# Patient Record
Sex: FEMALE | Race: BLACK OR AFRICAN AMERICAN | NOT HISPANIC OR LATINO | Employment: OTHER | URBAN - METROPOLITAN AREA
[De-identification: names, ages, dates, MRNs, and addresses within clinical notes are randomized per-mention and may not be internally consistent; named-entity substitution may affect disease eponyms.]

---

## 2017-03-21 ENCOUNTER — OFFICE VISIT (OUTPATIENT)
Dept: INTERNAL MEDICINE | Facility: CLINIC | Age: 62
End: 2017-03-21

## 2017-03-21 VITALS
SYSTOLIC BLOOD PRESSURE: 136 MMHG | WEIGHT: 155 LBS | HEIGHT: 61 IN | DIASTOLIC BLOOD PRESSURE: 90 MMHG | BODY MASS INDEX: 29.27 KG/M2 | HEART RATE: 74 BPM | OXYGEN SATURATION: 99 %

## 2017-03-21 DIAGNOSIS — E78.2 MIXED HYPERLIPIDEMIA: ICD-10-CM

## 2017-03-21 DIAGNOSIS — J00 ACUTE NASOPHARYNGITIS: ICD-10-CM

## 2017-03-21 DIAGNOSIS — I10 ESSENTIAL HYPERTENSION: Primary | ICD-10-CM

## 2017-03-21 LAB
ALBUMIN SERPL-MCNC: 3.51 G/DL (ref 3.4–4.6)
ALBUMIN/GLOB SERPL: 1 G/DL
ALP SERPL-CCNC: 84 U/L (ref 46–116)
ALT SERPL W P-5'-P-CCNC: 30 U/L (ref 14–59)
ANION GAP SERPL CALCULATED.3IONS-SCNC: 5 MMOL/L
AST SERPL-CCNC: 17 U/L (ref 7–37)
BASOPHILS # BLD AUTO: 0.01 10*3/MM3 (ref 0–0.2)
BASOPHILS NFR BLD AUTO: 0.1 % (ref 0–2)
BILIRUB SERPL-MCNC: 0.3 MG/DL (ref 0.2–1)
BUN BLD-MCNC: 20 MG/DL (ref 6–22)
BUN/CREAT SERPL: 19.2 (ref 7–25)
CALCIUM SPEC-SCNC: 8.9 MG/DL (ref 8.6–10.5)
CHLORIDE SERPL-SCNC: 105 MMOL/L (ref 95–107)
CHOLEST SERPL-MCNC: 149 MG/DL (ref 0–200)
CO2 SERPL-SCNC: 31 MMOL/L (ref 23–32)
CREAT BLD-MCNC: 1.04 MG/DL (ref 0.55–1.02)
DEPRECATED RDW RBC AUTO: 44.5 FL (ref 37–54)
EOSINOPHIL # BLD AUTO: 0.12 10*3/MM3 (ref 0–0.7)
EOSINOPHIL NFR BLD AUTO: 1.7 % (ref 0–5)
ERYTHROCYTE [DISTWIDTH] IN BLOOD BY AUTOMATED COUNT: 13.4 % (ref 11.5–15)
GFR SERPL CREATININE-BSD FRML MDRD: 65 ML/MIN/1.73
GLOBULIN UR ELPH-MCNC: 3.4 GM/DL
GLUCOSE BLD-MCNC: 91 MG/DL (ref 70–100)
HCT VFR BLD AUTO: 35.2 % (ref 34.1–44.9)
HDLC SERPL-MCNC: 64 MG/DL (ref 40–81)
HGB BLD-MCNC: 10.9 G/DL (ref 11.2–15.7)
LDLC SERPL CALC-MCNC: 67 MG/DL (ref 0–100)
LDLC/HDLC SERPL: 1.05 {RATIO}
LYMPHOCYTES # BLD AUTO: 1.62 10*3/MM3 (ref 0.8–7)
LYMPHOCYTES NFR BLD AUTO: 23.6 % (ref 10–60)
MCH RBC QN AUTO: 28.7 PG (ref 26–34)
MCHC RBC AUTO-ENTMCNC: 31 G/DL (ref 31–37)
MCV RBC AUTO: 92.6 FL (ref 80–100)
MONOCYTES # BLD AUTO: 0.3 10*3/MM3 (ref 0–1)
MONOCYTES NFR BLD AUTO: 4.4 % (ref 0–13)
NEUTROPHILS # BLD AUTO: 4.82 10*3/MM3 (ref 1–11)
NEUTROPHILS NFR BLD AUTO: 70.2 % (ref 30–85)
PLATELET # BLD AUTO: 184 10*3/MM3 (ref 150–450)
PMV BLD AUTO: 12.3 FL (ref 6–12)
POTASSIUM BLD-SCNC: 4.9 MMOL/L (ref 3.3–5.3)
PROT SERPL-MCNC: 6.9 G/DL (ref 6.3–8.4)
RBC # BLD AUTO: 3.8 10*6/MM3 (ref 3.93–5.22)
SODIUM BLD-SCNC: 141 MMOL/L (ref 136–145)
TRIGL SERPL-MCNC: 89 MG/DL (ref 0–150)
VLDLC SERPL-MCNC: 17.8 MG/DL
WBC NRBC COR # BLD: 6.87 10*3/MM3 (ref 5–10)

## 2017-03-21 PROCEDURE — 85025 COMPLETE CBC W/AUTO DIFF WBC: CPT | Performed by: INTERNAL MEDICINE

## 2017-03-21 PROCEDURE — 80053 COMPREHEN METABOLIC PANEL: CPT | Performed by: INTERNAL MEDICINE

## 2017-03-21 PROCEDURE — 36415 COLL VENOUS BLD VENIPUNCTURE: CPT | Performed by: INTERNAL MEDICINE

## 2017-03-21 PROCEDURE — 99214 OFFICE O/P EST MOD 30 MIN: CPT | Performed by: INTERNAL MEDICINE

## 2017-03-21 PROCEDURE — 80061 LIPID PANEL: CPT | Performed by: INTERNAL MEDICINE

## 2017-03-21 RX ORDER — AZITHROMYCIN 250 MG/1
TABLET, FILM COATED ORAL
Qty: 6 TABLET | Refills: 0 | Status: SHIPPED | OUTPATIENT
Start: 2017-03-21 | End: 2017-04-24

## 2017-03-21 NOTE — PROGRESS NOTES
Subjective   Astrid Blackwell is a 62 y.o. female.     Hypertension   This is a chronic problem. The current episode started more than 1 year ago. Pertinent negatives include no chest pain or palpitations.   Hyperlipidemia   This is a chronic problem. The current episode started more than 1 year ago. Pertinent negatives include no chest pain.        The following portions of the patient's history were reviewed and updated as appropriate: allergies, current medications, past family history, past medical history, past social history, past surgical history and problem list.    Review of Systems   Constitutional:        Not feeling well   HENT: Positive for postnasal drip, rhinorrhea and sore throat (Has had for past 3 days).    Eyes: Negative.    Respiratory: Positive for cough ( yellowish color).    Cardiovascular: Negative for chest pain and palpitations.   Gastrointestinal: Positive for diarrhea ( Had yesterday).   Genitourinary: Negative.    Musculoskeletal: Negative.    Neurological: Negative.    Psychiatric/Behavioral: Negative.        Objective   Physical Exam   Constitutional: She is oriented to person, place, and time. She appears well-developed.   HENT:   Head: Normocephalic.   Right Ear: External ear normal.   Left Ear: External ear normal.   Nose: Nose normal.   Mouth/Throat: Oropharynx is clear and moist.   Eyes: EOM are normal.   Neck: Neck supple.   Cardiovascular: Normal rate, regular rhythm and normal heart sounds.    Repeat 130/80   Pulmonary/Chest: Effort normal and breath sounds normal.   Musculoskeletal: Normal range of motion.   Neurological: She is alert and oriented to person, place, and time.   Vitals reviewed.      Assessment/Plan   Astrid was seen today for hypertension, hyperlipidemia and nasal congestion.    Diagnoses and all orders for this visit:    Essential hypertension  -     CBC Auto Differential; Future  -     Comprehensive Metabolic Panel; Future    Mixed hyperlipidemia  -     Lipid  Panel; Future    Acute nasopharyngitis  -     azithromycin (ZITHROMAX) 250 MG tablet; Take 2 tablets the first day, then 1 tablet daily for 4 days.      Advised usual OTC RXs

## 2017-04-24 ENCOUNTER — OFFICE VISIT (OUTPATIENT)
Dept: INTERNAL MEDICINE | Facility: CLINIC | Age: 62
End: 2017-04-24

## 2017-04-24 VITALS
HEIGHT: 61 IN | SYSTOLIC BLOOD PRESSURE: 150 MMHG | BODY MASS INDEX: 29.45 KG/M2 | HEART RATE: 70 BPM | OXYGEN SATURATION: 93 % | WEIGHT: 156 LBS | DIASTOLIC BLOOD PRESSURE: 90 MMHG

## 2017-04-24 DIAGNOSIS — M25.572 ACUTE LEFT ANKLE PAIN: Primary | ICD-10-CM

## 2017-04-24 DIAGNOSIS — M25.572 ACUTE LEFT ANKLE PAIN: ICD-10-CM

## 2017-04-24 LAB
BASOPHILS # BLD AUTO: 0.03 10*3/MM3 (ref 0–0.2)
BASOPHILS NFR BLD AUTO: 0.8 % (ref 0–2)
DEPRECATED RDW RBC AUTO: 44.7 FL (ref 37–54)
EOSINOPHIL # BLD AUTO: 0.04 10*3/MM3 (ref 0–0.7)
EOSINOPHIL NFR BLD AUTO: 1.1 % (ref 0–5)
ERYTHROCYTE [DISTWIDTH] IN BLOOD BY AUTOMATED COUNT: 13.7 % (ref 11.5–15)
ERYTHROCYTE [SEDIMENTATION RATE] IN BLOOD: 36 MM/HR (ref 0–20)
HCT VFR BLD AUTO: 37.7 % (ref 34.1–44.9)
HGB BLD-MCNC: 12 G/DL (ref 11.2–15.7)
LYMPHOCYTES # BLD AUTO: 1.18 10*3/MM3 (ref 0.8–7)
LYMPHOCYTES NFR BLD AUTO: 32.3 % (ref 10–60)
MCH RBC QN AUTO: 29.1 PG (ref 26–34)
MCHC RBC AUTO-ENTMCNC: 31.8 G/DL (ref 31–37)
MCV RBC AUTO: 91.5 FL (ref 80–100)
MONOCYTES # BLD AUTO: 0.26 10*3/MM3 (ref 0–1)
MONOCYTES NFR BLD AUTO: 7.1 % (ref 0–13)
NEUTROPHILS # BLD AUTO: 2.14 10*3/MM3 (ref 1–11)
NEUTROPHILS NFR BLD AUTO: 58.7 % (ref 30–85)
PLATELET # BLD AUTO: 185 10*3/MM3 (ref 150–450)
PMV BLD AUTO: 11.9 FL (ref 6–12)
RBC # BLD AUTO: 4.12 10*6/MM3 (ref 3.93–5.22)
URATE SERPL-MCNC: 4.8 MG/DL (ref 2.4–5.7)
WBC NRBC COR # BLD: 3.65 10*3/MM3 (ref 5–10)

## 2017-04-24 PROCEDURE — 99213 OFFICE O/P EST LOW 20 MIN: CPT | Performed by: INTERNAL MEDICINE

## 2017-04-24 PROCEDURE — 36415 COLL VENOUS BLD VENIPUNCTURE: CPT | Performed by: INTERNAL MEDICINE

## 2017-04-24 PROCEDURE — 85651 RBC SED RATE NONAUTOMATED: CPT | Performed by: INTERNAL MEDICINE

## 2017-04-24 PROCEDURE — 85025 COMPLETE CBC W/AUTO DIFF WBC: CPT | Performed by: INTERNAL MEDICINE

## 2017-04-24 RX ORDER — MELOXICAM 15 MG/1
15 TABLET ORAL DAILY
Qty: 30 TABLET | Refills: 1 | Status: SHIPPED | OUTPATIENT
Start: 2017-04-24 | End: 2022-01-18

## 2017-04-24 RX ORDER — MELOXICAM 15 MG/1
TABLET ORAL
Qty: 90 TABLET | Refills: 1 | OUTPATIENT
Start: 2017-04-24

## 2017-04-24 NOTE — PROGRESS NOTES
Subjective   Astrid Blackwell is a 62 y.o. female.     Ankle Pain    The incident occurred 5 to 7 days ago.        The following portions of the patient's history were reviewed and updated as appropriate: allergies, current medications, past family history, past medical history, past social history, past surgical history and problem list.    Review of Systems   Constitutional: Negative.    Musculoskeletal: Positive for arthralgias ( Began W/ painin L ankle since Saturday Has persisted  Is some better  Involves L ankle only No injury ).       Objective   Physical Exam   Constitutional: She appears well-developed.   HENT:   Head: Normocephalic.   Eyes: EOM are normal.   Cardiovascular: Normal rate and regular rhythm.    Pulmonary/Chest: Effort normal.   Musculoskeletal: Normal range of motion.   Exam of L foot & ankle is benign negative Pulses 2+ Nl ROM No localized tenderness Not warm to touch   Vitals reviewed.      Assessment/Plan   Astrid was seen today for knee pain.    Diagnoses and all orders for this visit:    Acute left ankle pain  -     CBC Auto Differential; Future  -     Sedimentation Rate  -     Uric acid; Future  -     meloxicam (MOBIC) 15 MG tablet; Take 1 tablet by mouth Daily.  -     CBC Auto Differential  -     Uric acid  -     Scan Slide; Future  -     Scan Slide

## 2017-04-25 LAB
GIANT PLATELETS: NORMAL
LARGE PLATELETS: NORMAL
RBC MORPH BLD: NORMAL
SMALL PLATELETS BLD QL SMEAR: ADEQUATE
WBC MORPH BLD: NORMAL

## 2017-05-02 ENCOUNTER — TELEPHONE (OUTPATIENT)
Dept: INTERNAL MEDICINE | Facility: CLINIC | Age: 62
End: 2017-05-02

## 2017-05-02 DIAGNOSIS — E78.5 HYPERLIPIDEMIA, UNSPECIFIED HYPERLIPIDEMIA TYPE: Primary | ICD-10-CM

## 2017-05-02 DIAGNOSIS — E78.5 HYPERLIPIDEMIA, UNSPECIFIED HYPERLIPIDEMIA TYPE: ICD-10-CM

## 2017-05-02 RX ORDER — PRAVASTATIN SODIUM 80 MG/1
TABLET ORAL
Qty: 90 TABLET | Refills: 1 | Status: SHIPPED | OUTPATIENT
Start: 2017-05-02 | End: 2018-01-02 | Stop reason: SDUPTHER

## 2017-05-02 RX ORDER — PRAVASTATIN SODIUM 80 MG/1
80 TABLET ORAL DAILY
Qty: 30 TABLET | Refills: 0 | Status: SHIPPED | OUTPATIENT
Start: 2017-05-02 | End: 2017-05-02 | Stop reason: SDUPTHER

## 2017-05-02 RX ORDER — PRAVASTATIN SODIUM 80 MG/1
80 TABLET ORAL DAILY
Qty: 90 TABLET | Refills: 3 | Status: SHIPPED | OUTPATIENT
Start: 2017-05-02 | End: 2017-05-02 | Stop reason: SDUPTHER

## 2017-07-12 DIAGNOSIS — J30.9 ALLERGIC RHINITIS: ICD-10-CM

## 2017-07-12 RX ORDER — FLUTICASONE PROPIONATE 50 MCG
SPRAY, SUSPENSION (ML) NASAL
Qty: 48 G | Refills: 2 | Status: SHIPPED | OUTPATIENT
Start: 2017-07-12

## 2017-07-30 DIAGNOSIS — I10 ESSENTIAL HYPERTENSION: ICD-10-CM

## 2017-07-30 DIAGNOSIS — K21.00 GASTROESOPHAGEAL REFLUX DISEASE WITH ESOPHAGITIS: ICD-10-CM

## 2017-07-31 RX ORDER — LISINOPRIL 5 MG/1
TABLET ORAL
Qty: 90 TABLET | Refills: 2 | Status: SHIPPED | OUTPATIENT
Start: 2017-07-31 | End: 2018-01-02 | Stop reason: SDUPTHER

## 2017-07-31 RX ORDER — ESOMEPRAZOLE MAGNESIUM 40 MG/1
CAPSULE, DELAYED RELEASE ORAL
Qty: 90 CAPSULE | Refills: 2 | Status: SHIPPED | OUTPATIENT
Start: 2017-07-31

## 2018-01-02 ENCOUNTER — TELEPHONE (OUTPATIENT)
Dept: INTERNAL MEDICINE | Facility: CLINIC | Age: 63
End: 2018-01-02

## 2018-01-02 DIAGNOSIS — I10 ESSENTIAL HYPERTENSION: ICD-10-CM

## 2018-01-02 DIAGNOSIS — E78.5 HYPERLIPIDEMIA, UNSPECIFIED HYPERLIPIDEMIA TYPE: ICD-10-CM

## 2018-01-02 RX ORDER — LISINOPRIL 5 MG/1
5 TABLET ORAL DAILY
Qty: 90 TABLET | Refills: 2 | Status: SHIPPED | OUTPATIENT
Start: 2018-01-02 | End: 2022-01-18

## 2018-01-02 RX ORDER — PRAVASTATIN SODIUM 80 MG/1
80 TABLET ORAL DAILY
Qty: 90 TABLET | Refills: 2 | Status: SHIPPED | OUTPATIENT
Start: 2018-01-02 | End: 2022-01-18

## 2018-01-02 NOTE — TELEPHONE ENCOUNTER
----- Message from Mallory Hnana MA sent at 1/2/2018  3:51 PM EST -----  Pt calling for refill    Lisinopril 5mg  Pravastatin  80mg    Alan aLu # [phone]    Pt insurance changed and she can not get in to see the new PCP until next month but is out of meds

## 2021-03-22 ENCOUNTER — BULK ORDERING (OUTPATIENT)
Dept: CASE MANAGEMENT | Facility: OTHER | Age: 66
End: 2021-03-22

## 2021-03-22 DIAGNOSIS — Z23 IMMUNIZATION DUE: ICD-10-CM

## 2022-01-18 ENCOUNTER — OFFICE VISIT (OUTPATIENT)
Dept: GASTROENTEROLOGY | Facility: CLINIC | Age: 67
End: 2022-01-18

## 2022-01-18 ENCOUNTER — TELEPHONE (OUTPATIENT)
Dept: GASTROENTEROLOGY | Facility: CLINIC | Age: 67
End: 2022-01-18

## 2022-01-18 VITALS — WEIGHT: 170.6 LBS | TEMPERATURE: 96.9 F | BODY MASS INDEX: 32.21 KG/M2 | HEIGHT: 61 IN

## 2022-01-18 DIAGNOSIS — R10.30 LOWER ABDOMINAL PAIN: ICD-10-CM

## 2022-01-18 DIAGNOSIS — D50.9 IRON DEFICIENCY ANEMIA, UNSPECIFIED IRON DEFICIENCY ANEMIA TYPE: ICD-10-CM

## 2022-01-18 DIAGNOSIS — R10.33 PERIUMBILICAL ABDOMINAL PAIN: ICD-10-CM

## 2022-01-18 DIAGNOSIS — K21.9 GASTROESOPHAGEAL REFLUX DISEASE, UNSPECIFIED WHETHER ESOPHAGITIS PRESENT: Primary | ICD-10-CM

## 2022-01-18 PROCEDURE — 99203 OFFICE O/P NEW LOW 30 MIN: CPT | Performed by: INTERNAL MEDICINE

## 2022-01-18 RX ORDER — LISINOPRIL 10 MG/1
10 TABLET ORAL DAILY
COMMUNITY
Start: 2021-11-05

## 2022-01-18 NOTE — PROGRESS NOTES
Chief Complaint   Patient presents with   • Abdominal Pain   • Heartburn   • Vomiting   • Anemia        Astrid Blackwell is a  66 y.o. female here for an initial visit for GERD, abdominal pain, anemia    HPI this 66-year-old -American female patient of Dr. Fernanda Ochoa presents with a history of anemia as well as issues with reflux, vomiting, and abdominal pain.  Her abdominal pain has been sharp and stabbing in nature and more of a periumbilical presentation that lasted for several days and then subsided.  Her current abdominal pain is in the lower abdomen on both right and left quadrants and of a cramping nature.  This is been a more chronic issue.  Her blood count reflects a drop in hemoglobin to a current level of 9.7 with hematocrit of 32.2.  She has occasional bright red blood per rectum but no other obvious bleeding source and denies any melena.  Previous colonoscopy by Dr. Helm performed in 2018 was negative except for diverticulosis.  She recalls distant history of upper and lower endoscopic examination but cannot recall specifics.  We discussed further evaluation and I would offer both upper and lower endoscopic evaluation at this time.  She is amenable to this.    Past Medical History:   Diagnosis Date   • Allergic rhinitis    • Anemia     ASSOC. W/HEAVY MENSES   • Blurry vision    • Bronchitis    • Depression 1997    X2 HOSPITALIZATIONS   • Dizziness    • Dysuria    • Elevated serum creatinine    • GERD (gastroesophageal reflux disease)    • Hematuria    • History of cardiac murmur in childhood    • HLD (hyperlipidemia)    • HTN (hypertension)    • Osteopenia    • Postmenopausal    • Prediabetes    • Right hip pain    • Vitamin D deficiency        Current Outpatient Medications   Medication Sig Dispense Refill   • albuterol sulfate  (90 Base) MCG/ACT inhaler Inhale 2 puffs Every 6 (Six) Hours As Needed for Wheezing. 1 inhaler 0   • esomeprazole (nexIUM) 40 MG capsule TAKE 1 CAPSULE DAILY  (Patient taking differently: 20 mg 2 (Two) Times a Day.) 90 capsule 2   • FLUoxetine (PROzac) 20 MG capsule Take 40 mg by mouth daily.     • fluticasone (FLONASE) 50 MCG/ACT nasal spray USE 2 SPRAYS IN EACH NOSTRIL NIGHTLY 48 g 2   • ipratropium (ATROVENT) 0.06 % nasal spray 2 sprays into each nostril 2 (two) times a day. 1 each 3   • lisinopril (PRINIVIL,ZESTRIL) 10 MG tablet        No current facility-administered medications for this visit.       PRN Meds:.    Allergies   Allergen Reactions   • Sulfa Antibiotics GI Intolerance       Social History     Socioeconomic History   • Marital status:    Tobacco Use   • Smoking status: Former Smoker   • Smokeless tobacco: Never Used   Substance and Sexual Activity   • Alcohol use: Yes     Comment: 2-3 x monthly   • Drug use: No       Family History   Problem Relation Age of Onset   • Hypertension Mother    • Diverticulitis Daughter         diverticulosis of intestine   • Hypertension Daughter    • Diverticulitis Son         diverticulosis of intestine   • Hypertension Son    • Breast cancer Sister    • Cancer Father    • No Known Problems Son    • No Known Problems Son        Review of Systems   Constitutional: Negative for activity change, appetite change, fatigue and unexpected weight change.   HENT: Negative for congestion, facial swelling, sore throat, trouble swallowing and voice change.    Eyes: Negative for photophobia and visual disturbance.   Respiratory: Negative for cough and choking.    Cardiovascular: Negative for chest pain.   Gastrointestinal: Positive for abdominal pain, nausea and vomiting. Negative for abdominal distention, anal bleeding, blood in stool, constipation, diarrhea and rectal pain.        GERD   Endocrine: Negative for polyphagia.   Musculoskeletal: Negative for arthralgias, gait problem and joint swelling.   Skin: Negative for color change, pallor and rash.   Allergic/Immunologic: Negative for food allergies.   Neurological: Negative  for speech difficulty and headaches.   Hematological: Does not bruise/bleed easily.   Psychiatric/Behavioral: Negative for agitation, confusion and sleep disturbance.       Vitals:    01/18/22 1449   Temp: 96.9 °F (36.1 °C)       Physical Exam  Vitals reviewed.   Constitutional:       General: She is not in acute distress.     Appearance: She is well-developed. She is not diaphoretic.   HENT:      Head: Normocephalic.      Mouth/Throat:      Pharynx: No oropharyngeal exudate.   Eyes:      General: No scleral icterus.     Conjunctiva/sclera: Conjunctivae normal.   Neck:      Thyroid: No thyromegaly.   Cardiovascular:      Rate and Rhythm: Normal rate and regular rhythm.      Heart sounds: No murmur heard.      Pulmonary:      Effort: No respiratory distress.      Breath sounds: Normal breath sounds. No wheezing or rales.   Abdominal:      General: Bowel sounds are normal. There is no distension.      Palpations: Abdomen is soft. There is no mass.      Tenderness: There is no abdominal tenderness.   Musculoskeletal:         General: No tenderness. Normal range of motion.      Cervical back: Normal range of motion.   Lymphadenopathy:      Cervical: No cervical adenopathy.   Skin:     General: Skin is warm and dry.      Findings: No erythema or rash.   Neurological:      Mental Status: She is alert and oriented to person, place, and time.   Psychiatric:         Behavior: Behavior normal.         ASSESSMENT  #1 GERD  #2 nausea with vomiting  #3 anemia  #4 abdominal pain      PLAN  Schedule EGD and colonoscopy  Continue PPI      ICD-10-CM ICD-9-CM   1. Gastroesophageal reflux disease, unspecified whether esophagitis present  K21.9 530.81   2. Iron deficiency anemia, unspecified iron deficiency anemia type  D50.9 280.9

## 2022-01-18 NOTE — TELEPHONE ENCOUNTER
ANNETTE April at [facility] via FAX for colonoscopy/EGD on 02/28/2022  arrive at  10am  . Gave Prep instructions in office. ----miralax    Advised that [facility] will call with final arrival time minimum 24 hrs before procedure. IF they do not get a phone call, arrival time will stay the same as given on instructions

## 2022-02-08 ENCOUNTER — TELEPHONE (OUTPATIENT)
Dept: GASTROENTEROLOGY | Facility: CLINIC | Age: 67
End: 2022-02-08

## 2022-02-08 NOTE — TELEPHONE ENCOUNTER
Pt wants to do her procedure at the hospital, not the surgery center.  She is on a c-pap machine and only wants to go to the hospital. Dr. Bruce 2- needs to reschedule.  [phone]

## 2022-02-09 NOTE — TELEPHONE ENCOUNTER
RSW Rupinder for colonoscopy/EGD on 05/13/2022  arrive at  730am  . Mailed Prep instructions to Mailing address on-file. ----miralax

## 2022-05-06 ENCOUNTER — TRANSCRIBE ORDERS (OUTPATIENT)
Dept: ADMINISTRATIVE | Facility: HOSPITAL | Age: 67
End: 2022-05-06

## 2022-05-06 DIAGNOSIS — Z01.818 OTHER SPECIFIED PRE-OPERATIVE EXAMINATION: Primary | ICD-10-CM

## 2022-05-10 ENCOUNTER — TELEPHONE (OUTPATIENT)
Dept: GASTROENTEROLOGY | Facility: CLINIC | Age: 67
End: 2022-05-10

## 2022-05-10 DIAGNOSIS — Z01.818 PRE-OP TESTING: Primary | ICD-10-CM

## 2022-05-13 ENCOUNTER — ANESTHESIA (OUTPATIENT)
Dept: GASTROENTEROLOGY | Facility: HOSPITAL | Age: 67
End: 2022-05-13

## 2022-05-13 ENCOUNTER — HOSPITAL ENCOUNTER (OUTPATIENT)
Facility: HOSPITAL | Age: 67
Setting detail: HOSPITAL OUTPATIENT SURGERY
Discharge: HOME | End: 2022-05-13
Attending: INTERNAL MEDICINE | Admitting: INTERNAL MEDICINE

## 2022-05-13 ENCOUNTER — ANESTHESIA EVENT (OUTPATIENT)
Dept: GASTROENTEROLOGY | Facility: HOSPITAL | Age: 67
End: 2022-05-13

## 2022-05-13 VITALS
HEART RATE: 73 BPM | WEIGHT: 162 LBS | HEIGHT: 61 IN | RESPIRATION RATE: 16 BRPM | DIASTOLIC BLOOD PRESSURE: 86 MMHG | BODY MASS INDEX: 30.58 KG/M2 | OXYGEN SATURATION: 99 % | SYSTOLIC BLOOD PRESSURE: 111 MMHG

## 2022-05-13 DIAGNOSIS — R10.33 PERIUMBILICAL ABDOMINAL PAIN: ICD-10-CM

## 2022-05-13 DIAGNOSIS — R10.30 LOWER ABDOMINAL PAIN: ICD-10-CM

## 2022-05-13 DIAGNOSIS — K21.9 GASTROESOPHAGEAL REFLUX DISEASE, UNSPECIFIED WHETHER ESOPHAGITIS PRESENT: ICD-10-CM

## 2022-05-13 DIAGNOSIS — D50.9 IRON DEFICIENCY ANEMIA, UNSPECIFIED IRON DEFICIENCY ANEMIA TYPE: ICD-10-CM

## 2022-05-13 PROCEDURE — S0260 H&P FOR SURGERY: HCPCS | Performed by: INTERNAL MEDICINE

## 2022-05-13 PROCEDURE — 45378 DIAGNOSTIC COLONOSCOPY: CPT | Performed by: INTERNAL MEDICINE

## 2022-05-13 PROCEDURE — 25010000002 PHENYLEPHRINE 10 MG/ML SOLUTION: Performed by: ANESTHESIOLOGY

## 2022-05-13 PROCEDURE — 87081 CULTURE SCREEN ONLY: CPT | Performed by: INTERNAL MEDICINE

## 2022-05-13 PROCEDURE — 88305 TISSUE EXAM BY PATHOLOGIST: CPT | Performed by: INTERNAL MEDICINE

## 2022-05-13 PROCEDURE — 25010000002 PROPOFOL 10 MG/ML EMULSION: Performed by: ANESTHESIOLOGY

## 2022-05-13 PROCEDURE — 43239 EGD BIOPSY SINGLE/MULTIPLE: CPT | Performed by: INTERNAL MEDICINE

## 2022-05-13 RX ORDER — PHENYLEPHRINE HYDROCHLORIDE 10 MG/ML
INJECTION INTRAVENOUS AS NEEDED
Status: DISCONTINUED | OUTPATIENT
Start: 2022-05-13 | End: 2022-05-13 | Stop reason: SURG

## 2022-05-13 RX ORDER — EPHEDRINE SULFATE 50 MG/ML
INJECTION, SOLUTION INTRAVENOUS AS NEEDED
Status: DISCONTINUED | OUTPATIENT
Start: 2022-05-13 | End: 2022-05-13 | Stop reason: SURG

## 2022-05-13 RX ORDER — PROPOFOL 10 MG/ML
VIAL (ML) INTRAVENOUS CONTINUOUS PRN
Status: DISCONTINUED | OUTPATIENT
Start: 2022-05-13 | End: 2022-05-13 | Stop reason: SURG

## 2022-05-13 RX ORDER — PROPOFOL 10 MG/ML
VIAL (ML) INTRAVENOUS AS NEEDED
Status: DISCONTINUED | OUTPATIENT
Start: 2022-05-13 | End: 2022-05-13 | Stop reason: SURG

## 2022-05-13 RX ORDER — LIDOCAINE HYDROCHLORIDE 20 MG/ML
INJECTION, SOLUTION INFILTRATION; PERINEURAL AS NEEDED
Status: DISCONTINUED | OUTPATIENT
Start: 2022-05-13 | End: 2022-05-13 | Stop reason: SURG

## 2022-05-13 RX ORDER — SODIUM CHLORIDE, SODIUM LACTATE, POTASSIUM CHLORIDE, CALCIUM CHLORIDE 600; 310; 30; 20 MG/100ML; MG/100ML; MG/100ML; MG/100ML
30 INJECTION, SOLUTION INTRAVENOUS CONTINUOUS PRN
Status: DISCONTINUED | OUTPATIENT
Start: 2022-05-13 | End: 2022-05-13 | Stop reason: HOSPADM

## 2022-05-13 RX ADMIN — Medication 200 MCG/KG/MIN: at 08:41

## 2022-05-13 RX ADMIN — PHENYLEPHRINE HYDROCHLORIDE 100 MCG: 10 INJECTION, SOLUTION INTRAVENOUS at 09:00

## 2022-05-13 RX ADMIN — LIDOCAINE HYDROCHLORIDE 60 MG: 20 INJECTION, SOLUTION INFILTRATION; PERINEURAL at 08:41

## 2022-05-13 RX ADMIN — SODIUM CHLORIDE, POTASSIUM CHLORIDE, SODIUM LACTATE AND CALCIUM CHLORIDE 30 ML/HR: 600; 310; 30; 20 INJECTION, SOLUTION INTRAVENOUS at 08:24

## 2022-05-13 RX ADMIN — EPHEDRINE SULFATE 10 MG: 50 INJECTION INTRAVENOUS at 09:00

## 2022-05-13 RX ADMIN — PROPOFOL 150 MG: 10 INJECTION, EMULSION INTRAVENOUS at 08:41

## 2022-05-13 NOTE — ANESTHESIA POSTPROCEDURE EVALUATION
Patient: Astrid Blackwell    Procedure Summary     Date: 05/13/22 Room / Location:  [facility]    Anesthesia Start: 0831 Anesthesia Stop: 0911    Procedures:       COLONOSCOPY (N/A )      ESOPHAGOGASTRODUODENOSCOPY with biopsies (N/A Esophagus) Diagnosis:       Gastroesophageal reflux disease, unspecified whether esophagitis present      Iron deficiency anemia, unspecified iron deficiency anemia type      Lower abdominal pain      Periumbilical abdominal pain      (Gastroesophageal reflux disease, unspecified whether esophagitis present [K21.9])      (Iron deficiency anemia, unspecified iron deficiency anemia type [D50.9])      (Lower abdominal pain [R10.30])      (Periumbilical abdominal pain [R10.33])    Surgeons: Walter Bruce MD Provider: Shaka Jeff MD    Anesthesia Type: MAC ASA Status: 2          Anesthesia Type: MAC    Vitals  No vitals data found for the desired time range.          Post Anesthesia Care and Evaluation    Patient location during evaluation: PHASE II  Patient participation: complete - patient participated  Level of consciousness: awake and alert  Pain management: adequate  Airway patency: patent  Anesthetic complications: No anesthetic complications  PONV Status: none  Cardiovascular status: acceptable and hemodynamically stable  Respiratory status: acceptable, nonlabored ventilation and spontaneous ventilation  Hydration status: acceptable

## 2022-05-13 NOTE — ANESTHESIA PREPROCEDURE EVALUATION
Anesthesia Evaluation     Patient summary reviewed and Nursing notes reviewed                Airway   Mallampati: II  TM distance: >3 FB  Neck ROM: full  No difficulty expected  Dental - normal exam     Pulmonary - normal exam   (+) a smoker Former,   Cardiovascular - normal exam    (+) hypertension less than 2 medications, hyperlipidemia,       Neuro/Psych  (+) dizziness/light headedness, psychiatric history Depression,    GI/Hepatic/Renal/Endo    (+) obesity,  GERD,      Musculoskeletal     (+) back pain, chronic pain,   Abdominal   (+) obese,    Substance History      OB/GYN          Other                        Anesthesia Plan    ASA 2     MAC     intravenous induction     Anesthetic plan, all risks, benefits, and alternatives have been provided, discussed and informed consent has been obtained with: patient.        CODE STATUS:

## 2022-05-13 NOTE — DISCHARGE INSTRUCTIONS
For the next 24 hours patient needs to be with a responsible adult.    For 24 hours DO NOT drive, operate machinery, appliances, drink alcohol, make important decisions or sign legal documents.    Start with a light or bland diet if you are feeling sick to your stomach otherwise advance to regular diet as tolerated.    Follow recommendations on procedure report if provided by your doctor.    Call Dr. Bruce for problems [phone]    Problems may include but not limited to: large amounts of bleeding, trouble breathing, repeated vomiting, severe unrelieved pain, fever or chills.

## 2022-05-13 NOTE — H&P
[facility], [facility]  Pre Procedure History & Physical    Chief Complaint:   GERD, abdominal pain, anemia, nausea with vomiting    Subjective     HPI:   This 67-year-old female presents the endoscopy suite for upper and lower endoscopic evaluations.  She has had issues with reflux as well as nausea and vomiting also complains of some periumbilical abdominal pain and noted iron deficiency anemia.  Last colonoscopy per her account was in 2018.    Past Medical History:   Past Medical History:   Diagnosis Date   • Allergic rhinitis    • Anemia     ASSOC. W/HEAVY MENSES   • Blurry vision    • Bronchitis    • Depression 1997    X2 HOSPITALIZATIONS   • Dizziness    • Dysuria    • Elevated serum creatinine    • GERD (gastroesophageal reflux disease)    • Hematuria    • History of cardiac murmur in childhood    • HLD (hyperlipidemia)    • HTN (hypertension)    • Osteopenia    • Postmenopausal    • Prediabetes    • Right hip pain    • Vitamin D deficiency        Past Surgical History:  Past Surgical History:   Procedure Laterality Date   • BARTHOLIN GLAND CYST EXCISION      marsupialized, 1980's   • BREAST LUMPECTOMY  2007   • COLONOSCOPY  10/08/2018    Alicja MORLEY   • GANGLION CYST EXCISION Right     wrist   • HEMORRHOIDECTOMY     • HYSTERECTOMY  2007    due to heavy menses   • SHOULDER SURGERY Right 2013       Family History:  Family History   Problem Relation Age of Onset   • Hypertension Mother    • Cancer Father    • Breast cancer Sister    • Diverticulitis Daughter         diverticulosis of intestine   • Hypertension Daughter    • Diverticulitis Son         diverticulosis of intestine   • Hypertension Son    • No Known Problems Son    • No Known Problems Son    • Malig Hyperthermia Neg Hx        Social History:   reports that she has quit smoking. She has never used smokeless tobacco. She reports current alcohol use. She reports that she does not use drugs.    Medications:   No medications prior to admission.        Allergies:  Sulfa antibiotics    ROS:    Pertinent items are noted in HPI, all other systems reviewed and negative     Objective     not currently breastfeeding.    Physical Exam   Constitutional: Pt is oriented to person, place, and time and well-developed, well-nourished, and in no distress.   Mouth/Throat: Oropharynx is clear and moist.   Neck: Normal range of motion.   Cardiovascular: Normal rate, regular rhythm and normal heart sounds.    Pulmonary/Chest: Effort normal and breath sounds normal.   Abdominal: Soft. Nontender  Skin: Skin is warm and dry.   Psychiatric: Mood, memory, affect and judgment normal.     Assessment & Plan     Diagnosis:  GERD  Nausea with vomiting  Abdominal pain  Anemia    Anticipated Surgical Procedure:  EGD, colonoscopy    The risks, benefits, and alternatives of this procedure have been discussed with the patient or the responsible party- the patient understands and agrees to proceed.

## 2022-05-14 LAB — UREASE TISS QL: NEGATIVE

## 2022-05-17 LAB
LAB AP CASE REPORT: NORMAL
LAB AP DIAGNOSIS COMMENT: NORMAL
PATH REPORT.FINAL DX SPEC: NORMAL
PATH REPORT.GROSS SPEC: NORMAL

## 2022-05-26 ENCOUNTER — TELEPHONE (OUTPATIENT)
Dept: GASTROENTEROLOGY | Facility: CLINIC | Age: 67
End: 2022-05-26

## 2022-05-26 NOTE — TELEPHONE ENCOUNTER
"Patient notified of results per path and recommendations and verbalized understanding    Pt stated she is still having pain her in abdomen.  She is taking the omeprazole, but not the sucralfate.  She said it is causing constipation and \"she can't stand to be constipated\"  "

## 2022-05-26 NOTE — TELEPHONE ENCOUNTER
I would have her increase the omeprazole to 20 mg twice daily and see how she does.  Have her call us back Tuesday with an update.  May need to change to an even stronger medicine if that does not work.  May need to consider further work-up with a CT scan or a mesenteric duplex ultrasound.

## 2022-05-26 NOTE — TELEPHONE ENCOUNTER
----- Message from Walter GOODEN MD sent at 5/18/2022  5:26 PM EDT -----  Regarding: Biopsy results  Okay to call results, recommend continue current medical regimen.  If abdominal pain persist can consider further work-up to include CT scan as well as possible mesenteric Doppler study.  ----- Message -----  From: Lab, Background User  Sent: 5/14/2022  12:16 PM EDT  To: Walter GOODEN MD

## 2023-09-12 ENCOUNTER — OFFICE VISIT (OUTPATIENT)
Dept: GASTROENTEROLOGY | Facility: CLINIC | Age: 68
End: 2023-09-12
Payer: COMMERCIAL

## 2023-09-12 VITALS
OXYGEN SATURATION: 96 % | HEART RATE: 84 BPM | DIASTOLIC BLOOD PRESSURE: 84 MMHG | SYSTOLIC BLOOD PRESSURE: 138 MMHG | TEMPERATURE: 96.9 F | HEIGHT: 61 IN | BODY MASS INDEX: 29.98 KG/M2 | WEIGHT: 158.8 LBS

## 2023-09-12 DIAGNOSIS — K21.9 GASTROESOPHAGEAL REFLUX DISEASE, UNSPECIFIED WHETHER ESOPHAGITIS PRESENT: Primary | ICD-10-CM

## 2023-09-12 DIAGNOSIS — D64.9 ANEMIA, UNSPECIFIED TYPE: ICD-10-CM

## 2023-09-12 PROCEDURE — 99213 OFFICE O/P EST LOW 20 MIN: CPT | Performed by: INTERNAL MEDICINE

## 2023-09-12 RX ORDER — DOXYCYCLINE HYCLATE 50 MG/1
1 CAPSULE, GELATIN COATED ORAL
COMMUNITY
Start: 2023-08-29

## 2023-09-12 NOTE — PROGRESS NOTES
Chief Complaint   Patient presents with    Gastroesophageal reflux disease    Anemia     Possible GI bleed per PCP        Astridrob Blackwell is a  68 y.o. female here for a follow up visit for GERD, anemia, incomplete bowel evacuation    HPI this 68-year-old female patient of Tatianna Ochoa MD presents in follow-up since she last underwent upper and lower endoscopies in May 2022.  Those studies were done because of an iron deficiency anemia.  EGD revealed some mild duodenitis, gastritis, and esophagitis.  Colonoscopy showed diverticulosis and internal hemorrhoids.  She would be due for follow-up colonoscopy in 5 years time.  She was offered small bowel enteroscopy if her anemia persisted.  Review of her recent labs show that hemoglobin and hematocrit are static for the past 2 years.  She complains of some incomplete evacuation of the bowels with occasional discharge after the fact.  We talked about a trial of a fiber supplement and she will initiate this.  She will call with a progress report in 4 to 6 weeks time.  If she warrants more formal evaluation in that area we can consider anorectal manometry.    Past Medical History:   Diagnosis Date    Allergic rhinitis     Anemia     ASSOC. W/HEAVY MENSES    Blurry vision     Bronchitis     Depression 1997    X2 HOSPITALIZATIONS    Dizziness     Dysuria     Elevated serum creatinine     GERD (gastroesophageal reflux disease)     Hematuria     History of cardiac murmur in childhood     HLD (hyperlipidemia)     HTN (hypertension)     Osteopenia     Postmenopausal     Prediabetes     Right hip pain     Vitamin D deficiency        Current Outpatient Medications   Medication Sig Dispense Refill    albuterol sulfate  (90 Base) MCG/ACT inhaler Inhale 2 puffs Every 6 (Six) Hours As Needed for Wheezing. 1 inhaler 0    atorvastatin (LIPITOR) 20 MG tablet Take 1 tablet by mouth Daily. 30 tablet 11    clotrimazole-betamethasone (LOTRISONE) 1-0.05 % cream Apply 1 application   topically to the appropriate area as directed 2 (Two) Times a Day. 30 g 0    esomeprazole (nexIUM) 40 MG capsule TAKE 1 CAPSULE DAILY (Patient taking differently: 20 mg 2 (Two) Times a Day.) 90 capsule 2    ferrous gluconate (FERGON) 324 MG tablet Take 1 tablet by mouth Daily With Breakfast.      FLUoxetine (PROzac) 10 MG capsule Take 1 capsule by mouth Daily.      FLUoxetine (PROzac) 20 MG capsule Take 2 capsules by mouth Daily.      fluticasone (FLONASE) 50 MCG/ACT nasal spray USE 2 SPRAYS IN EACH NOSTRIL NIGHTLY 48 g 2    ipratropium (ATROVENT) 0.06 % nasal spray 2 sprays into each nostril 2 (two) times a day. 1 each 3    lisinopril (PRINIVIL,ZESTRIL) 10 MG tablet Take 1 tablet by mouth Daily.      omeprazole (priLOSEC) 20 MG capsule Take 1 capsule by mouth Daily.      sucralfate (CARAFATE) 1 g tablet Take 1 tablet by mouth 4 (Four) Times a Day.       No current facility-administered medications for this visit.       PRN Meds:.    Allergies   Allergen Reactions    Sulfa Antibiotics GI Intolerance       Social History     Socioeconomic History    Marital status:    Tobacco Use    Smoking status: Former    Smokeless tobacco: Never   Substance and Sexual Activity    Alcohol use: Yes     Comment: 2-3 x monthly    Drug use: No       Family History   Problem Relation Age of Onset    Hypertension Mother     Cancer Father     Breast cancer Sister     Diverticulitis Daughter         diverticulosis of intestine    Hypertension Daughter     Diverticulitis Son         diverticulosis of intestine    Hypertension Son     No Known Problems Son     No Known Problems Son     Malig Hyperthermia Neg Hx        Review of Systems   Constitutional:  Negative for activity change, appetite change, fatigue and unexpected weight change.   HENT:  Negative for congestion, facial swelling, sore throat, trouble swallowing and voice change.    Eyes:  Negative for photophobia and visual disturbance.   Respiratory:  Negative for cough and  choking.    Cardiovascular:  Negative for chest pain.   Gastrointestinal:  Negative for abdominal distention, abdominal pain, anal bleeding, blood in stool, constipation, diarrhea, nausea, rectal pain and vomiting.        GERD  Incomplete evacuation   Endocrine: Negative for polyphagia.   Musculoskeletal:  Negative for arthralgias, gait problem and joint swelling.   Skin:  Negative for color change, pallor and rash.   Allergic/Immunologic: Negative for food allergies.   Neurological:  Negative for speech difficulty and headaches.   Hematological:  Does not bruise/bleed easily.   Psychiatric/Behavioral:  Negative for agitation, confusion and sleep disturbance.      Vitals:    09/12/23 1528   BP: 138/84   Pulse: 84   Temp: 96.9 °F (36.1 °C)   SpO2: 96%       Physical Exam  Constitutional:       Appearance: She is well-developed.   HENT:      Head: Normocephalic.   Eyes:      Conjunctiva/sclera: Conjunctivae normal.   Cardiovascular:      Rate and Rhythm: Normal rate and regular rhythm.   Pulmonary:      Breath sounds: Normal breath sounds.   Abdominal:      General: Bowel sounds are normal.      Palpations: Abdomen is soft.   Musculoskeletal:         General: Normal range of motion.      Cervical back: Normal range of motion.   Skin:     General: Skin is warm and dry.   Neurological:      Mental Status: She is alert and oriented to person, place, and time.   Psychiatric:         Behavior: Behavior normal.       ASSESSMENT   #1 GERD  #2 diverticulosis  #3 incomplete evacuation  #4 anemia      PLAN  Trial of fiber, patient to call with a progress report in 4 to 6 weeks  Continue PPI  Monitor H&H as per primary MD      ICD-10-CM ICD-9-CM   1. Gastroesophageal reflux disease, unspecified whether esophagitis present  K21.9 530.81   2. Anemia, unspecified type  D64.9 285.9

## (undated) DEVICE — KT ORCA ORCAPOD DISP STRL

## (undated) DEVICE — TUBING, SUCTION, 1/4" X 10', STRAIGHT: Brand: MEDLINE

## (undated) DEVICE — CANN O2 ETCO2 FITS ALL CONN CO2 SMPL A/ 7IN DISP LF

## (undated) DEVICE — FRCP BX RADJAW4 NDL 2.8 240CM LG OG BX40

## (undated) DEVICE — ADAPT CLN BIOGUARD AIR/H2O DISP

## (undated) DEVICE — BITEBLOCK OMNI BLOC

## (undated) DEVICE — SENSR O2 OXIMAX FNGR A/ 18IN NONSTR

## (undated) DEVICE — LN SMPL CO2 SHTRM SD STREAM W/M LUER